# Patient Record
Sex: FEMALE | Race: WHITE
[De-identification: names, ages, dates, MRNs, and addresses within clinical notes are randomized per-mention and may not be internally consistent; named-entity substitution may affect disease eponyms.]

---

## 2019-02-25 ENCOUNTER — HOSPITAL ENCOUNTER (EMERGENCY)
Dept: HOSPITAL 80 - FED | Age: 79
Discharge: HOME | End: 2019-02-25
Payer: COMMERCIAL

## 2019-02-25 VITALS — SYSTOLIC BLOOD PRESSURE: 168 MMHG | DIASTOLIC BLOOD PRESSURE: 96 MMHG

## 2019-02-25 DIAGNOSIS — R07.9: Primary | ICD-10-CM

## 2019-02-25 DIAGNOSIS — Z79.01: ICD-10-CM

## 2019-02-25 DIAGNOSIS — Z86.711: ICD-10-CM

## 2019-02-25 DIAGNOSIS — E86.9: ICD-10-CM

## 2019-02-25 LAB
INR PPP: 2.34 (ref 0.83–1.16)
PLATELET # BLD: 253 10^3/UL (ref 150–400)
PROTHROMBIN TIME: 25.6 SEC (ref 12–15)

## 2019-02-25 PROCEDURE — 99285 EMERGENCY DEPT VISIT HI MDM: CPT

## 2019-02-25 PROCEDURE — 96374 THER/PROPH/DIAG INJ IV PUSH: CPT

## 2019-02-25 PROCEDURE — 96361 HYDRATE IV INFUSION ADD-ON: CPT

## 2019-02-25 PROCEDURE — 93005 ELECTROCARDIOGRAM TRACING: CPT

## 2019-02-25 PROCEDURE — 71275 CT ANGIOGRAPHY CHEST: CPT

## 2019-02-25 PROCEDURE — 71046 X-RAY EXAM CHEST 2 VIEWS: CPT

## 2019-02-25 NOTE — CPEKG
Test Reason : OPEN

Blood Pressure : ***/*** mmHG

Vent. Rate : 068 BPM     Atrial Rate : 067 BPM

   P-R Int : 214 ms          QRS Dur : 087 ms

    QT Int : 422 ms       P-R-T Axes : 050 024 017 degrees

   QTc Int : 449 ms

 

Sinus rhythm

Borderline prolonged ID interval

 

Confirmed by Donovan Ferrer ([id number]) on 2/25/2019 5:55:19 PM

 

Referred By: Donovan Ferrer           Confirmed By:Donovan Ferrer

## 2019-02-25 NOTE — EDPHY
H & P


Stated Complaint: cp last few hours substernl back to chest


Time Seen by Provider: 02/25/19 17:42


HPI/ROS: 





CHIEF COMPLAINT:  Chest pain





HISTORY OF PRESENT ILLNESS:  The patient is a 78-year-old female with a history 

of ankylosing spondylitis, Bruce syndrome, esophageal strictures as well as 

previous PE's currently on Coumadin.  She was watching TV 2 hr ago when she 

developed sudden retrosternal chest pain that she describes as mild and 

diffuse.  It does hurt more with lifting her arms above her head.  No shortness 

of breath.  No nausea vomiting.  No diaphoresis.  No lightheadedness.  Some 

radiation to her back.  No to her arms or jaw.  She denies any history of 

cardiac or pulmonary disease.  She states that this feels different than her 

esophageal strictures and food impactions.  She was not eating at the time.  

She thinks that it is musculoskeletal.


Severity:  Moderate


Modifying factors:  Worsened by movement or lifting arms above head





REVIEW OF SYSTEMS:


Constitutional:  denies: chills, fever, recent illness, recent injury


EENTM: denies: blurred vision, double vision, nose congestion


Respiratory: denies: cough, shortness of breath


Cardiac:  See HPI d


Gastrointestinal/Abdominal: denies: abdominal pain, diarrhea, nausea, vomiting, 

blood streaked stools


Genitourinary: denies: dysuria, frequency, hematuria, pain


Musculoskeletal: denies: joint pain, muscle pain


Skin: denies: lesions, rash, jaundice, bruising


Neurological: denies: headache, numbness, paresthesia, tingling, dizziness, 

weakness


Hematologic/Lymphatic: denies: blood clots, easy bleeding, easy bruising


Immunologic/allergic: denies: HIV/AIDS, transplant


 10 systems reviewed and negative except as noted





EXAM:


GENERAL:  Well-appearing, well-nourished and in no acute distress.


HEAD:  Atraumatic, normocephalic.


EYES:  Pupils equal round and reactive to light, extraocular movements intact, 

sclera anicteric, conjunctiva are normal.


ENT:  TMs normal, nares patent, oropharynx clear without exudates.  Moist 

mucous membranes.


NECK:  Normal range of motion, supple without lymphadenopathy or JVD.


LUNGS:  Breath sounds clear to auscultation bilaterally and equal.  No wheezes 

rales or rhonchi.


HEART:  Regular rate and rhythm without murmurs, rubs or gallops.


ABDOMEN:  Soft, nontender, normoactive bowel sounds.  No guarding, no rebound.  

No masses appreciated. 


BACK:  No CVA tenderness, no spinal tenderness, step-offs or deformities


EXTREMITIES:  Normal range of motion, no pitting or edema.  No clubbing or 

cyanosis.


NEUROLOGICAL:  Cranial nerves II through XII grossly intact.  Normal speech, 

normal gait.  5/5 strength, normal movement in all extremities, normal sensation

, normal reflexes


PSYCH:  Normal mood, normal affect.


SKIN:  Warm, dry, normal turgor, no visible rashes or lesions.








Source: Patient


Exam Limitations: No limitations





- Personal History


Current Tetanus Diphtheria and Acellular Pertussis (TDAP): Unsure





- Medical/Surgical History


Hx Asthma: No


Hx Chronic Respiratory Disease: No


Hx Diabetes: No


Hx Cardiac Disease: No


Hx Renal Disease: No


Hx Cirrhosis: No


Hx Alcoholism: No


Hx HIV/AIDS: No


Hx Splenectomy or Spleen Trauma: No


Other PMH: Espophogeal stricture, ankylosing spondylosis, [place] Syndrome, PE, 

Kidney Stone





- Family History


Significant Family History: No pertinent family hx





- Social History


Smoking Status: Former smoker


Alcohol Use: None


Drug Use: None


Constitutional: 


 Initial Vital Signs











Temperature (C)  36.7 C   02/25/19 17:33


 


Heart Rate  72   02/25/19 17:33


 


Respiratory Rate  18   02/25/19 17:33


 


Blood Pressure  170/112 H  02/25/19 17:33


 


O2 Sat (%)  94   02/25/19 17:33








 











O2 Delivery Mode               Room Air














Allergies/Adverse Reactions: 


 





cefuroxime Allergy (Verified 02/25/19 17:32)


 


metronidazole [From Flagyl] Allergy (Verified 01/27/16 15:54)


 


Penicillins Allergy (Verified 01/27/16 15:54)


 


Sulfa (Sulfonamide Antibiotics) Allergy (Verified 01/27/16 15:54)


 








Home Medications: 














 Medication  Instructions  Recorded


 


Folic Acid  01/27/16


 


Lisinopril  01/27/16


 


Methotrexate  01/27/16


 


Warfarin Sodium  01/27/16














Medical Decision Making





- Diagnostics


EKG Interpretation: 





An EKG obtained and was read and documented in trace view.  Please see trace 

view for full reading and report.  Sinus rhythm, no acute ischemic changes 


Imaging Results: 


 Imaging Impressions





Chest X-Ray  02/25/19 17:49


Impression:  Suspect airways disease, with no superimposed acute abnormality 

identified.


 








Chest/Thorax CTA  02/25/19 19:55


Impression:


1. No evidence of pulmonary embolic disease.


2. See above report for additional findings.


 


Results called and discussed with AISHA QUISPE M.D. on 2/25/2019 at 21:21.


 











Imaging: Discussed imaging studies w/ On call Radiologist


ED Course/Re-evaluation: 





8:00 p.m. Patient's D-dimer is elevated.  She is on Coumadin.  She has had PEs 

before.  Will angio.  Her EKG and troponin are reassuring.  Will repeat 

troponin.





9:30 p.m. the patient's 2nd troponin CT are reassuring.  She is currently 

asymptomatic and is eager to go home.  She would like some pain medication to 

help her sleep.  She is requesting Dilaudid.  Will give her a small dose.  She 

and her  are very grateful.  They declined further workup or testing at 

this time.  We discussed follow-up as well as indications for returning.  

Patient now able to raise arms above head without difficulty.


Differential Diagnosis: 





Partial list of the Differential diagnosis considered include but were not 

limited to;  acute coronary disease, musculoskeletal pain, PE and although 

unlikely based on the history and physical exam, I also considered pneumothorax

, pneumonia, neuropathy.  I discussed these differential diagnoses and the plan 

with the patient as well as the usual and expected course.  The patient 

understands that the diagnosis is provisional and that in medicine we are not 

always correct and that further workup is often warranted.  Usual and customary 

warnings were given.  All of the patient's questions were answered.  The 

patient was instructed to return to the emergency department should the 

symptoms at all worsen or return, otherwise to followup with the physician as 

we discussed.





- Data Points


Laboratory Results: 


 Laboratory Results





 02/25/19 17:45 





 02/25/19 17:45 





 











  02/25/19 02/25/19 02/25/19





  20:12 17:51 17:45


 


WBC      





    


 


RBC      





    


 


Hgb      





    


 


Hct      





    


 


MCV      





    


 


MCH      





    


 


MCHC      





    


 


RDW      





    


 


Plt Count      





    


 


MPV      





    


 


Neut % (Auto)      





    


 


Lymph % (Auto)      





    


 


Mono % (Auto)      





    


 


Eos % (Auto)      





    


 


Baso % (Auto)      





    


 


Nucleat RBC Rel Count      





    


 


Absolute Neuts (auto)      





    


 


Absolute Lymphs (auto)      





    


 


Absolute Monos (auto)      





    


 


Absolute Eos (auto)      





    


 


Absolute Basos (auto)      





    


 


Absolute Nucleated RBC      





    


 


Immature Gran %      





    


 


Immature Gran #      





    


 


PT      





    


 


INR      





    


 


APTT      





    


 


D-Dimer      





    


 


Sodium      136 mEq/L mEq/L





     (135-145) 


 


Potassium      4.4 mEq/L mEq/L





     (3.5-5.2) 


 


Chloride      106 mEq/L mEq/L





     () 


 


Carbon Dioxide      21 mEq/l L mEq/l





     (22-31) 


 


Anion Gap      9 mEq/L mEq/L





     (6-14) 


 


BUN      17 mg/dL mg/dL





     (7-23) 


 


Creatinine      0.6 mg/dL mg/dL





     (0.6-1.0) 


 


Estimated GFR      > 60 





    


 


Glucose      89 mg/dL mg/dL





     () 


 


Calcium      9.5 mg/dL mg/dL





     (8.5-10.4) 


 


Total Bilirubin      0.5 mg/dL mg/dL





     (0.1-1.4) 


 


Conjugated Bilirubin      0.4 mg/dL mg/dL





     (0.0-0.5) 


 


Unconjugated Bilirubin      0.1 mg/dL mg/dL





     (0.0-1.1) 


 


AST      39 IU/L IU/L





     (14-46) 


 


ALT      44 IU/L IU/L





     (9-52) 


 


Alkaline Phosphatase      81 IU/L IU/L





     () 


 


POC Troponin I  0.01 ng/mL ng/mL  0.01 ng/mL ng/mL  





   (0.00-0.08)   (0.00-0.08)  


 


Total Protein      6.9 g/dL g/dL





     (6.3-8.2) 


 


Albumin      4.3 g/dL g/dL





     (3.5-5.0) 


 


Lipase      92 IU/L IU/L





     () 














  02/25/19 02/25/19





  17:45 17:45


 


WBC    5.05 10^3/uL 10^3/uL





    (3.80-9.50) 


 


RBC    4.52 10^6/uL 10^6/uL





    (4.18-5.33) 


 


Hgb    13.7 g/dL g/dL





    (12.6-16.3) 


 


Hct    41.3 % %





    (38.0-47.0) 


 


MCV    91.4 fL fL





    (81.5-99.8) 


 


MCH    30.3 pg pg





    (27.9-34.1) 


 


MCHC    33.2 g/dL g/dL





    (32.4-36.7) 


 


RDW    14.9 % %





    (11.5-15.2) 


 


Plt Count    253 10^3/uL 10^3/uL





    (150-400) 


 


MPV    9.8 fL fL





    (8.7-11.7) 


 


Neut % (Auto)    55.8 % %





    (39.3-74.2) 


 


Lymph % (Auto)    28.1 % %





    (15.0-45.0) 


 


Mono % (Auto)    12.5 % %





    (4.5-13.0) 


 


Eos % (Auto)    2.4 % %





    (0.6-7.6) 


 


Baso % (Auto)    0.8 % %





    (0.3-1.7) 


 


Nucleat RBC Rel Count    0.0 % %





    (0.0-0.2) 


 


Absolute Neuts (auto)    2.82 10^3/uL 10^3/uL





    (1.70-6.50) 


 


Absolute Lymphs (auto)    1.42 10^3/uL 10^3/uL





    (1.00-3.00) 


 


Absolute Monos (auto)    0.63 10^3/uL 10^3/uL





    (0.30-0.80) 


 


Absolute Eos (auto)    0.12 10^3/uL 10^3/uL





    (0.03-0.40) 


 


Absolute Basos (auto)    0.04 10^3/uL 10^3/uL





    (0.02-0.10) 


 


Absolute Nucleated RBC    0.00 10^3/uL 10^3/uL





    (0-0.01) 


 


Immature Gran %    0.4 % %





    (0.0-1.1) 


 


Immature Gran #    0.02 10^3/uL 10^3/uL





    (0.00-0.10) 


 


PT  25.6 SEC H SEC  





   (12.0-15.0)  


 


INR  2.34  H   





   (0.83-1.16)  


 


APTT  41.5 SEC H SEC  





   (23.0-38.0)  


 


D-Dimer  2.13 ug/mLFEU H ug/mLFEU  





   (0.00-0.50)  


 


Sodium    





   


 


Potassium    





   


 


Chloride    





   


 


Carbon Dioxide    





   


 


Anion Gap    





   


 


BUN    





   


 


Creatinine    





   


 


Estimated GFR    





   


 


Glucose    





   


 


Calcium    





   


 


Total Bilirubin    





   


 


Conjugated Bilirubin    





   


 


Unconjugated Bilirubin    





   


 


AST    





   


 


ALT    





   


 


Alkaline Phosphatase    





   


 


POC Troponin I    





   


 


Total Protein    





   


 


Albumin    





   


 


Lipase    





   











Medications Given: 


 








Discontinued Medications





Aspirin (Aspirin)  324 mg PO EDNOW ONE


   Stop: 02/25/19 17:49


   Last Admin: 02/25/19 17:52 Dose:  324 mg


Hydromorphone HCl (Dilaudid)  0.5 mg IVP EDNOW ONE


   Stop: 02/25/19 21:29


   Last Admin: 02/25/19 21:37 Dose:  0.5 mg


Sodium Chloride (Ns)  500 mls @ 0 mls/hr IV EDNOW ONE; Wide [facility]


   PRN Reason: Protocol


   Stop: 02/25/19 17:49


   Last Admin: 02/25/19 17:52 Dose:  500 mls





Point of Care Test Results: 


 Chemistry











  02/25/19 02/25/19





  20:12 17:51


 


POC Troponin I  0.01 ng/mL ng/mL  0.01 ng/mL ng/mL





   (0.00-0.08)   (0.00-0.08) 














Departure





- Departure


Disposition: Home, Routine, Self-Care


Clinical Impression: 


Chest pain


Qualifiers:


 Chest pain type: unspecified Qualified Code(s): R07.9 - Chest pain, unspecified





Condition: Fair


Instructions:  Hydromorphone (By mouth), Chest Pain (ED)


Referrals: 


NONE *PRIMARY CARE P,. [Primary Care Provider] - As per Instructions


[facility] ,. [[facility] for Call Sched] - 2-3 days, call for appt.